# Patient Record
Sex: FEMALE | Race: WHITE | HISPANIC OR LATINO | Employment: UNEMPLOYED | URBAN - METROPOLITAN AREA
[De-identification: names, ages, dates, MRNs, and addresses within clinical notes are randomized per-mention and may not be internally consistent; named-entity substitution may affect disease eponyms.]

---

## 2024-08-20 ENCOUNTER — HOSPITAL ENCOUNTER (OUTPATIENT)
Dept: OCCUPATIONAL THERAPY | Facility: HOSPITAL | Age: 59
Setting detail: THERAPIES SERIES
Discharge: HOME | End: 2024-08-20
Payer: COMMERCIAL

## 2024-08-20 DIAGNOSIS — I89.0 LYMPHEDEMA OF BOTH LOWER EXTREMITIES: ICD-10-CM

## 2024-08-20 DIAGNOSIS — I89.0 LYMPHEDEMA, NOT ELSEWHERE CLASSIFIED: Primary | ICD-10-CM

## 2024-08-20 PROCEDURE — 97535 SELF CARE MNGMENT TRAINING: CPT

## 2024-08-20 PROCEDURE — 97165 OT EVAL LOW COMPLEX 30 MIN: CPT

## 2024-08-20 NOTE — THERAPY EVALUATION
Outpatient Occupational Therapy Lymphedema Initial Evaluation  [facility]     Patient Name: Michelle Andrews  : 1965  MRN: [id number]  Today's Date: 2024      Visit Date: 2024    There is no problem list on file for this patient.       No past medical history on file.     No past surgical history on file.      Visit Dx:     ICD-10-CM ICD-9-CM   1. Lymphedema, not elsewhere classified  I89.0 457.1   2. Lymphedema of both lower extremities  I89.0 457.1        Patient History       Row Name 24 1400             History    Chief Complaint Swelling;Pain  -CW      Date Current Problem(s) Began --  3 years ago  -CW      Brief Description of Current Complaint Pt. reports her LE edema started 3 years ago and has gotten worse. Hx cervical cancer, vein surgery, LLE tendon reconstruction, fx after a fall  LLE, , HTN, gallbladder surgery, knee arthritis. Pt. states her ortho MD may consider TKR after her swelling is down and she has lost weight.  -CW      Patient/Caregiver Goals Relieve pain;Know what to do to help the symptoms;Decrease swelling  -CW      How has patient tried to help current problem? Compression bandages, stockings, and elevation.  -CW         Services    Are you currently receiving Home Health services No  -CW         Daily Activities    Primary Language Peruvian  -CW      Action taken if English not primary language Cousin present to assist.  -CW      Are you able to read Yes  -CW      Are you able to write Yes  -CW      Teaching needs identified Management of Condition;Home Exercise Program  -CW      Patient is concerned about/has problems with Climbing Stairs;Performing home management (household chores, shopping, care of dependents);Standing;Walking  -CW      Barriers to learning Language  -CW      Functional Status mobility issues preventing performance of daily activities;dressing  -CW      Explanation of Functional Status Problem Has difficulty with LE dressing, steps,  "walking. Standing for long time periods causes LE pain. Shoes do not fit at times.  -CW      Pt Participated in POC and Goals Yes  -CW                User Key  (r) = Recorded By, (t) = Taken By, (c) = Cosigned By      Initials Name Provider Type    Lupe Tristan OTR Occupational Therapist                     Lymphedema       Row Name 08/20/24 1400             Subjective Pain    Able to rate subjective pain? yes  -CW      Pre-Treatment Pain Level 9  -CW      Post-Treatment Pain Level 9  -CW         Subjective    Subjective Comments Pt. reports BLE pain especially knees 9/10, back and L shoulder pain.  -CW         Lymphedema Assessment    Lymphedema Classification LLE:;RLE:;stage 2 (Spontaneously Irreversible)  -CW      Lymphedema Cancer Related Sx --  Hx cervical cancer 1992  -CW      Chemo Received no  -CW      Radiation Therapy Received no  -CW      Infections or Cellulitis? unspecified  -CW         LLIS - Physical Concerns    The amount of pain associated with my lymphedema is: 4  -CW      The amount of limb heaviness associated with my lymphedema is: 4  -CW      The amount of skin tightness associated with my lymphedema is: 4  -CW      The size of my swollen limb(s) seems: 4  -CW      Lymphedema affects the movement of my swollen limb(s): 4  -CW      The strength in my swollen limb(s) is: 4  -CW         LLIS - Psychosocial Concerns    Lymphedema affects my body image (i.e., \"how I think I look\"). 3  -CW      Lymphedema affects my socializing with others. 3  -CW      Lymphedema affects my intimate relations with spouse or partner (rate 0 if not applicable 3  -CW      Lymphedema \"gets me down\" (i.e., depression, frustration, or anger) 4  -CW      I must rely on others for help due to my lymphedema. 3  -CW      I know what to do to manage my lymphedema 3  -CW         LLIS - Functional Concerns    Lymphedema affects my ability to perform self-care activities (i.e. eating, dressing, hygiene) 3  -CW      Lymphedema " affects my ability to perform routine home or work-related activities. 3  -CW      Lymphedema affects my performance of preferred leisure activities. 4  -CW      Lymphedema affects proper fit of clothing/shoes 3  -CW      Lymphedema affects my sleep 3  -CW         Posture/Observations    Observations Edema  -CW      Posture/Observations Comments No AD to ambulate  -CW         General ROM    GENERAL ROM COMMENTS AROM limited B knees due to pain. Hip flex L decreased.  -CW         MMT (Manual Muscle Testing)    General MMT Comments N/A due to pain.  -CW         Lymphedema Edema Assessment    Ptting Edema Category By grade out of 4  -CW      Pitting Edema + 3/4;Moderate  -CW      Stemmer Sign positive  -CW         Skin Changes/Observations    Location/Assessment Lower Extremity  -CW      Lower Extremity Conditions bilateral:;intact;dry  -CW      Lower Extremity Color/Pigment bilateral:;fibrosis  -CW         Lymphedema Sensation    Lymphedema Sensation Reports RLE:;LLE:;normal  -CW         Lymphedema Measurements    Measurement Type(s) Circumferential  -CW      Circumferential Areas Lower extremities  -CW         BLE Circumferential (cm)    Measurement Location 1 10 cm. above knee  -CW      Left 1 61 cm  -CW      Right 1 63 cm  -CW      Measurement Location 2 knee  -CW      Left 2 56 cm  -CW      Right 2 57 cm  -CW      Measurement Location 3 10 cm below  knee  -CW      Left 3 56.5 cm  -CW      Right 3 56 cm  -CW      Measurement Location 4 20 cm below knee  -CW      Left 4 47.9 cm  -CW      Right 4 48.8 cm  -CW      Measurement Location 5 30 cm below knee  -CW      Left 5 37.5 cm  -CW      Right 5 35.9 cm  -CW      Measurement Location 6 ankle  -CW      Left 6 36.9 cm  -CW      Right 6 34.7 cm  -CW      Measurement Location 7 mid foot  -CW      Left 7 25 cm  -CW      Right 7 24.6 cm  -CW      LLE Circumferential Total 320.8 cm  -CW      RLE Circumferential Total 320 cm  -CW         Compression/Skin Care     Compression/Skin Care compression garment  -CW      Compression Garment Comments Removed compression garments from home  -CW         Lymphedema Life Impact Scale Totals    A.  Total Q1 - Q17 (Do not include Q18) 59  -CW      B.  Total number of questions answered (Q1-Q17) 17  -CW      C. Divide A by B 3.47  -CW      D. Multiple C by 25 86.75  -CW                User Key  (r) = Recorded By, (t) = Taken By, (c) = Cosigned By      Initials Name Provider Type    CW Lupe Hancock OTR Occupational Therapist                            Therapy Education  Education Details: Discussed lymph. tx. program and poc. Reviewed long term edema management options. Issued handouts including Healthy habits for edema risk reduction.  Given: Edema management, Symptoms/condition management  Program: New  How Provided: Verbal, Demonstration  Provided to: Patient  Level of Understanding: Verbalized  75933 - OT Self Care/Mgmt Minutes: 15         OT Goals       Row Name 08/20/24 1400          OT Short Term Goals    STG Date to Achieve 09/03/24  -CW     STG 1 Patient to demonstrate proper awareness of “What is Lymphedema” and DO’s and Don’ts” for improved prevention, management, care of symptoms, and ease of transition to self-care of condition.  -CW     STG 1 Progress New  -CW     STG 2 Patient independent and compliant with self-wrapping techniques of compression bandages with family member or caregiver as indicated for improved self-management of condition.  -CW     STG 2 Progress New  -CW     STG 3 Patient demonstrate decreased net edema of  BLE's   >/5-10 cm. for decreased in edema, symptoms, decreased risk of infection, and improved skin-care/transition to self-care of condition.  -CW     STG 3 Progress New  -CW     STG 4 Patient independent and compliant with home exercise program (as able) focused on range of motion, flexibility, to improve lymphatic flow and discomfort.  -CW     STG 4 Progress New  -        Long Term Goals    LTG  Date to Achieve 09/17/24  -CW     LTG 1 Patient will demonstrate decreased net edema of  BLE's >/=10-20 cm. for decrease in symptoms, decreased risk of infection, and improved skin-care/transition to self-care of condition.  -CW     LTG 1 Progress New  -CW     LTG 2 Patient/family/caregivers independent with self-care techniques for self-management of condition.  -CW     LTG 2 Progress New  -CW     LTG 3 Patient independent and compliant with use and care of compression (example garments, inelastic velcro compression) with assistance of a caregiver as needed to promote self-care independence.  -CW     LTG 3 Progress New  -CW        Time Calculation    OT Goal Re-Cert Due Date 11/12/24  -CW               User Key  (r) = Recorded By, (t) = Taken By, (c) = Cosigned By      Initials Name Provider Type    Lupe Tristan OTR Occupational Therapist                     OT Assessment/Plan       Row Name 08/20/24 1424          OT Assessment    Functional Limitations Performance in self-care ADL;Limitations in functional capacity and performance;Limitation in home management;Limitations in community activities  -CW     Impairments Edema;Impaired lymphatic circulation;Integumentary integrity;Impaired venous circulation;Pain;Range of motion  -CW     Assessment Comments Pt. presents 60 y/o female with moderate increased edema BLE’s. Edema is 3+ feet to above knees. Pt. reports her LE edema started 3 years ago and has gotten worse. Hx cervical cancer, arthritis, tendon reconstructive surgery per pt. Pt. complains of pain 9/10 B knees/lower legs. Total circumference .0 cm. and .8 cm. Skin is intact with no rashes or skin irritation. No open wounds.  Mobility including AROM is limited B knees and L hip flex. Pt. would benefit from full Complete Decongestive Therapy (CDT) to decrease edema, decrease risk of infection, improve skin integrity, and to learn independent self-care edema management. Pt. has tried LE  elevation and compression stockings in the past. Discussd long term edema management options. Pt. reports walking, steps, LE dressing and standing are difficult.  Pt. lives in [place] and is visiting family. Will try to work with pt. regarding tx and her travel plans.  -CW     Please refer to paper survey for additional self-reported information Yes  -CW     OT Rehab Potential Good  -CW     Patient/caregiver participated in establishment of treatment plan and goals Yes  -CW     Patient would benefit from skilled therapy intervention Yes  -CW        OT Plan    OT Frequency 4x/week  -CW     Predicted Duration of Therapy Intervention (OT) 1 month. Pt. lives in [place] and is visiting family in the [place]. Will work with pt. to coordinate tx with her travel plans.  -CW     Planned CPT's? OT EVAL LOW COMPLEXITY: 94590;OT MANUAL THERAPY EA 15 MIN: 01111;OT SELF CARE/MGMT/TRAIN 15 MIN: 18482;OT THER PROC EA 15 MIN: 11042WI  -CW     Planned Therapy Interventions (Optional Details) patient/family education;manual therapy techniques;home exercise program  -CW     OT Plan Comments Plan to schedule pt. for tx.  -CW               User Key  (r) = Recorded By, (t) = Taken By, (c) = Cosigned By      Initials Name Provider Type    CW Lupe Hancock OTR Occupational Therapist                              Time Calculation:   OT Start Time: 1230  OT Stop Time: 1330  OT Time Calculation (min): 60 min  Total Timed Code Minutes- OT: 15 minute(s)  Timed Charges  28196 - OT Self Care/Mgmt Minutes: 15  Total Minutes  Timed Charges Total Minutes: 15   Total Minutes: 15     Therapy Charges for Today       Code Description Service Date Service Provider Modifiers Qty    20599743634  OT SELF CARE/MGMT/TRAIN EA 15 MIN 8/20/2024 Lupe Hancock OTR GO 1    64101472399 HC OT EVAL LOW COMPLEXITY 3 8/20/2024 Lupe Hancock OTR GO 1                      RONALD Wakefield  8/20/2024

## 2024-08-21 ENCOUNTER — DOCUMENTATION (OUTPATIENT)
Dept: OCCUPATIONAL THERAPY | Facility: HOSPITAL | Age: 59
End: 2024-08-21
Payer: COMMERCIAL

## 2024-08-21 DIAGNOSIS — I89.0 LYMPHEDEMA, NOT ELSEWHERE CLASSIFIED: Primary | ICD-10-CM

## 2024-08-21 DIAGNOSIS — I89.0 LYMPHEDEMA OF BOTH LOWER EXTREMITIES: ICD-10-CM

## 2024-08-21 NOTE — THERAPY TREATMENT NOTE
Outpatient Occupational Therapy Lymphedema Treatment Note       Patient Name: Michelle Andrews  : 1965  MRN: [id number]  Today's Date: 2024      Visit Date: 2024    There is no problem list on file for this patient.       No past medical history on file.     No past surgical history on file.      Visit Dx:      ICD-10-CM ICD-9-CM   1. Lymphedema, not elsewhere classified  I89.0 457.1   2. Lymphedema of both lower extremities  I89.0 457.1                    OT Assessment/Plan       Row Name 24 1143          OT Assessment    Assessment Comments For phase 1 lymphedema treatment decongestive phase pt. needs the following bandage supplies: Tg9, Cellona 10 cm quantity 4, Rosidal soft 10 cm x.4 cm x2.5m quantity 6, Rosidal 8cm quantity 4, Rosidal 10 cm quantity 6.  -ZOE               User Key  (r) = Recorded By, (t) = Taken By, (c) = Cosigned By      Initials Name Provider Type    Lupe Tristan OTR Occupational Therapist                                               Time Calculation:                         RONALD Wakefield  2024

## 2024-08-23 ENCOUNTER — DOCUMENTATION (OUTPATIENT)
Dept: OCCUPATIONAL THERAPY | Facility: HOSPITAL | Age: 59
End: 2024-08-23
Payer: COMMERCIAL

## 2024-08-23 DIAGNOSIS — I89.0 LYMPHEDEMA, NOT ELSEWHERE CLASSIFIED: Primary | ICD-10-CM

## 2024-08-23 DIAGNOSIS — I89.0 LYMPHEDEMA OF BOTH LOWER EXTREMITIES: ICD-10-CM

## 2024-08-23 NOTE — THERAPY TREATMENT NOTE
Outpatient Occupational Therapy Lymphedema Treatment Note       Patient Name: Michelle Andrews  : 1965  MRN: [id number]  Today's Date: 2024      Visit Date: 2024    There is no problem list on file for this patient.       No past medical history on file.     No past surgical history on file.      Visit Dx:      ICD-10-CM ICD-9-CM   1. Lymphedema, not elsewhere classified  I89.0 457.1   2. Lymphedema of both lower extremities  I89.0 457.1                    OT Assessment/Plan       Row Name 24 1423          OT Assessment    Assessment Comments Pt. presents 58 y/o female with moderate increased edema 3+ BLE lymphedema feet to above knees. Skin is firm,fibrotic BLE's with discomfort and pain 9/10. Pt. has tried conservative tx and received lymphedema tx. in the past for >25 days including compression bandages, manual lymph drainage, elevation, compression stockings, HEP/therap. ex. Even though pt. has tried these conservative methods, pt.'s symptoms will most likely still persist over time and may get worse. This may lead to advances skin changes and other edema related symptoms including increased fibrosis, edema, sore/ulcers, infections. Pt. may benefit from a compression pump to help address her BLE lymphedema and to help pt. become independent with lymph. management.  -ZOE               User Key  (r) = Recorded By, (t) = Taken By, (c) = Cosigned By      Initials Name Provider Type    Lupe Tristan OTR Occupational Therapist                                               Time Calculation:                         RONALD Wakefield  2024

## 2024-08-23 NOTE — THERAPY TREATMENT NOTE
Outpatient Occupational Therapy Lymphedema Treatment Note       Patient Name: Michelle Andrews  : 1965  MRN: [id number]  Today's Date: 2024      Visit Date: 2024    There is no problem list on file for this patient.       No past medical history on file.     No past surgical history on file.      Visit Dx:      ICD-10-CM ICD-9-CM   1. Lymphedema, not elsewhere classified  I89.0 457.1                    OT Assessment/Plan       Row Name 24 1406          OT Assessment    Assessment Comments For phase 1 lymphedema tx leading to phase 2 maintenance phase of lymphedema treatment pt. needs the following:  Circaid Reduction kit Regular, standard length lower leg  quantity 2  -CW               User Key  (r) = Recorded By, (t) = Taken By, (c) = Cosigned By      Initials Name Provider Type    Lupe Tristan OTR Occupational Therapist                                               Time Calculation:                         RONALD Wakefield  2024

## 2024-08-26 ENCOUNTER — HOSPITAL ENCOUNTER (OUTPATIENT)
Dept: OCCUPATIONAL THERAPY | Facility: HOSPITAL | Age: 59
Discharge: HOME | End: 2024-08-26
Admitting: ORTHOPAEDIC SURGERY
Payer: COMMERCIAL

## 2024-08-26 DIAGNOSIS — I89.0 LYMPHEDEMA, NOT ELSEWHERE CLASSIFIED: Primary | ICD-10-CM

## 2024-08-26 DIAGNOSIS — I89.0 LYMPHEDEMA OF BOTH LOWER EXTREMITIES: ICD-10-CM

## 2024-08-26 PROCEDURE — 97140 MANUAL THERAPY 1/> REGIONS: CPT

## 2024-08-26 NOTE — THERAPY TREATMENT NOTE
Outpatient Occupational Therapy Lymphedema Treatment Note  [facility]     Patient Name: Michelle Andrews  : 1965  MRN: [id number]  Today's Date: 2024      Visit Date: 2024    There is no problem list on file for this patient.       No past medical history on file.     No past surgical history on file.      Visit Dx:      ICD-10-CM ICD-9-CM   1. Lymphedema, not elsewhere classified  I89.0 457.1   2. Lymphedema of both lower extremities  I89.0 457.1        Lymphedema       Row Name 24 0700             Subjective Pain    Able to rate subjective pain? yes  -CW      Pre-Treatment Pain Level 9  -CW      Post-Treatment Pain Level 9  -CW         Subjective    Subjective Comments Pain LLE>RLE. Reports L arm pain.  -CW         Skin Changes/Observations    Location/Assessment Lower Extremity  -CW      Lower Extremity Conditions bilateral:;intact;dry  -CW      Lower Extremity Color/Pigment bilateral:;fibrosis  -CW         Manual Lymphatic Drainage    Manual Lymphatic Drainage initial sequence;opened regional lymph nodes;opened anastamoses;extremity treatment  -CW      Initial Sequence short neck;abdomen  -CW      Abdomen superficial  -CW      Opened Regional Lymph Nodes inguinal;axillary  -CW      Axillary left;right  -CW      Inguinal left;right  -CW      Opened Anastamoses inguino-axillary  -CW      Inguino-Axillary right;left  -CW      Extremity Treatment MLD to full limb  -CW         Compression/Skin Care    Compression/Skin Care skin care;wrapping location;bandaging;compression garment;remove bandages  -CW      Skin Care moisturizing lotion applied  -CW      Wrapping Location lower extremity  -CW      Wrapping Location LE ankle;calf  -CW      Wrapping Comments Tg9, 1-10 cm, 1-15 cm Artiflex. 1-8cm, 1-10cm Rosidal  -CW      Bandage Layers cotton liner;padding/fluff layer;short-stretch bandages (comment size/quantity)  -CW      Bandaging Technique circumferential/spiral;moderate compression  -CW                 User Key  (r) = Recorded By, (t) = Taken By, (c) = Cosigned By      Initials Name Provider Type    Lupe Tristan OTANGEL Occupational Therapist                             OT Assessment/Plan       Row Name 08/26/24 1412          OT Assessment    Assessment Comments Pt. reports increased pain BLE's LLE>RLE 9/10 and LUE pain. Pt. was able to wear her compression stockings.  Skin dry and intact with no rashes or skin irritation. MLD sequence completed with pt. supine. Applied the bandages with moderate compression as tolerated.  Pt. can remove or loosen top bandage layer at night if needed for comfort. Reviewed bandage precautions and wearing schedule. Family member present to help with language interpretation.  -CW        OT Plan    OT Plan Comments Plant to cont. CDT and progress to indep. lymph. management.  -CW               User Key  (r) = Recorded By, (t) = Taken By, (c) = Cosigned By      Initials Name Provider Type    Lupe Tristan OTR Occupational Therapist                        Manual Rx (Last 36 Hours)       Manual Treatments       Row Name 08/26/24 1415             Total Minutes    44036 - OT Manual Therapy Minutes 60  -CW                User Key  (r) = Recorded By, (t) = Taken By, (c) = Cosigned By      Initials Name Provider Type    Lupe Tristan OTANGEL Occupational Therapist                      Therapy Education  Education Details: Reviewed bandage precautions and wearing schedule. Pt. can loosen or remove top bandage layer at night if needed for comfort.  Given: Edema management, Symptoms/condition management, Bandaging/dressing change  Program: New  How Provided: Verbal, Demonstration  Provided to: Patient, Caregiver  Level of Understanding: Verbalized                Time Calculation:   OT Start Time: 0730  OT Stop Time: 0830  OT Time Calculation (min): 60 min  Total Timed Code Minutes- OT: 60 minute(s)  Timed Charges  39355 - OT Manual Therapy Minutes: 60  Total Minutes  Timed Charges  Total Minutes: 60   Total Minutes: 60     Therapy Charges for Today       Code Description Service Date Service Provider Modifiers Qty    47577947138 HC OT MANUAL THERAPY EA 15 MIN 8/26/2024 Lupe Hancock OTR GO 4                        RONALD Wakefield  8/26/2024

## 2024-08-28 ENCOUNTER — HOSPITAL ENCOUNTER (OUTPATIENT)
Dept: OCCUPATIONAL THERAPY | Facility: HOSPITAL | Age: 59
Discharge: HOME | End: 2024-08-28
Admitting: ORTHOPAEDIC SURGERY
Payer: COMMERCIAL

## 2024-08-28 DIAGNOSIS — I89.0 LYMPHEDEMA OF BOTH LOWER EXTREMITIES: ICD-10-CM

## 2024-08-28 DIAGNOSIS — I89.0 LYMPHEDEMA, NOT ELSEWHERE CLASSIFIED: Primary | ICD-10-CM

## 2024-08-28 PROCEDURE — 97140 MANUAL THERAPY 1/> REGIONS: CPT

## 2024-08-28 NOTE — THERAPY TREATMENT NOTE
Outpatient Occupational Therapy Lymphedema Treatment Note  [facility]     Patient Name: Michelle Andrews  : 1965  MRN: [id number]  Today's Date: 2024      Visit Date: 2024    There is no problem list on file for this patient.       No past medical history on file.     No past surgical history on file.      Visit Dx:      ICD-10-CM ICD-9-CM   1. Lymphedema, not elsewhere classified  I89.0 457.1   2. Lymphedema of both lower extremities  I89.0 457.1        Lymphedema       Row Name 24 0900             Subjective Pain    Able to rate subjective pain? yes  -CW      Pre-Treatment Pain Level 2  -CW      Post-Treatment Pain Level 2  -CW         Subjective    Subjective Comments LE pain decreased today. Cont. L UE pain.  -CW         Skin Changes/Observations    Location/Assessment Lower Extremity  -CW      Lower Extremity Conditions bilateral:;intact;dry  -CW      Lower Extremity Color/Pigment bilateral:;fibrosis  -CW         Manual Lymphatic Drainage    Manual Lymphatic Drainage initial sequence;opened regional lymph nodes;opened anastamoses;extremity treatment  -CW      Initial Sequence short neck;abdomen  -CW      Abdomen superficial  -CW      Opened Regional Lymph Nodes inguinal;axillary  -CW      Axillary left;right  -CW      Inguinal left;right  -CW      Opened Anastamoses inguino-axillary  -CW      Inguino-Axillary right;left  -CW      Extremity Treatment MLD to full limb  -CW         Compression/Skin Care    Compression/Skin Care skin care;wrapping location;bandaging;compression garment;remove bandages  -CW      Skin Care moisturizing lotion applied  -CW      Wrapping Location lower extremity  -CW      Wrapping Location LE ankle;calf  -CW      Wrapping Comments Tg9, 1-10 cm, 1-15 cm Artiflex. 1-8cm, 1-10cm Rosidal  -CW      Bandage Layers cotton liner;padding/fluff layer;short-stretch bandages (comment size/quantity)  -CW      Bandaging Technique circumferential/spiral;moderate compression   -CW                User Key  (r) = Recorded By, (t) = Taken By, (c) = Cosigned By      Initials Name Provider Type    Lupe Tristan OTR Occupational Therapist                             OT Assessment/Plan       Row Name 08/28/24 1406          OT Assessment    Assessment Comments Pt. reports B LE pain 2/10 at present. Pt. was able to wear the bandages or her stockings for the last few days. Skin dry and intact with no rashes or skin irritation. MLD sequence completed with pt. supine. Applied the bandages with moderate compression as tolerated. Edema decreased B mid calf to ankle per observation. Pt. can remove or loosen top bandage layer at night if needed for comfort. Reviewed bandage precautions and wearing schedule. Reviewed how to apply the compression bandages. Family member present to help with language interpretation.  -CW        OT Plan    OT Plan Comments Plan to cont. CDT and progress to indep. lymph. management.  -CW               User Key  (r) = Recorded By, (t) = Taken By, (c) = Cosigned By      Initials Name Provider Type    Lupe Tristan OTR Occupational Therapist                        Manual Rx (Last 36 Hours)       Manual Treatments       Row Name 08/28/24 1409             Total Minutes    61862 - OT Manual Therapy Minutes 60  -CW                User Key  (r) = Recorded By, (t) = Taken By, (c) = Cosigned By      Initials Name Provider Type    Lupe Tristan OTR Occupational Therapist                      Therapy Education  Education Details: Reviewed bandage precautions and wearing schedule. Pt. can loosen or remove top bandage layer at night if needed for comfort. Encouraged continued BLE elevation.  Given: Edema management, Symptoms/condition management, Bandaging/dressing change, Other (comment)  Program: Reinforced  How Provided: Verbal, Demonstration  Provided to: Patient  Level of Understanding: Verbalized                Time Calculation:   OT Start Time: 0930  OT Stop Time:  1030  OT Time Calculation (min): 60 min  Total Timed Code Minutes- OT: 60 minute(s)  Timed Charges  29470 - OT Manual Therapy Minutes: 60  Total Minutes  Timed Charges Total Minutes: 60   Total Minutes: 60     Therapy Charges for Today       Code Description Service Date Service Provider Modifiers Qty    76117820339 HC OT MANUAL THERAPY EA 15 MIN 8/28/2024 Lupe Hancock OTR GO 4                        RONALD Wakefield  8/28/2024

## 2024-08-30 ENCOUNTER — HOSPITAL ENCOUNTER (OUTPATIENT)
Dept: OCCUPATIONAL THERAPY | Facility: HOSPITAL | Age: 59
Discharge: HOME | End: 2024-08-30
Payer: COMMERCIAL

## 2024-08-30 DIAGNOSIS — I89.0 LYMPHEDEMA, NOT ELSEWHERE CLASSIFIED: Primary | ICD-10-CM

## 2024-08-30 DIAGNOSIS — I89.0 LYMPHEDEMA OF BOTH LOWER EXTREMITIES: ICD-10-CM

## 2024-08-30 PROCEDURE — 97140 MANUAL THERAPY 1/> REGIONS: CPT

## 2024-08-30 NOTE — THERAPY TREATMENT NOTE
Outpatient Occupational Therapy Lymphedema Treatment Note  [facility]     Patient Name: Michelle Andrews  : 1965  MRN: [id number]  Today's Date: 2024      Visit Date: 2024    There is no problem list on file for this patient.       No past medical history on file.     No past surgical history on file.      Visit Dx:      ICD-10-CM ICD-9-CM   1. Lymphedema, not elsewhere classified  I89.0 457.1   2. Lymphedema of both lower extremities  I89.0 457.1        Lymphedema       Row Name 24 1100             Subjective Pain    Able to rate subjective pain? yes  -CW      Pre-Treatment Pain Level 6  -CW      Post-Treatment Pain Level 6  -CW         Subjective    Subjective Comments B knee pain 6/10  -CW         Skin Changes/Observations    Location/Assessment Lower Extremity  -CW      Lower Extremity Conditions bilateral:;intact;dry  -CW      Lower Extremity Color/Pigment bilateral:;fibrosis  -CW         Manual Lymphatic Drainage    Manual Lymphatic Drainage initial sequence;opened regional lymph nodes;opened anastamoses;extremity treatment  -CW      Initial Sequence short neck;abdomen  -CW      Abdomen superficial  -CW      Opened Regional Lymph Nodes inguinal;axillary  -CW      Axillary left;right  -CW      Inguinal left;right  -CW      Opened Anastamoses inguino-axillary  -CW      Inguino-Axillary right;left  -CW      Extremity Treatment MLD to full limb  -CW         Compression/Skin Care    Compression/Skin Care skin care;wrapping location;bandaging;compression garment;remove bandages  -CW      Skin Care moisturizing lotion applied  -CW      Wrapping Location lower extremity  -CW      Wrapping Location LE ankle;calf  -CW      Wrapping Comments Tg9, 1-10 cm, 1-15 cm Artiflex. 1-8cm, 1-10cm Rosidal  -CW      Bandage Layers cotton liner;padding/fluff layer;short-stretch bandages (comment size/quantity)  -CW      Bandaging Technique circumferential/spiral;moderate compression  -CW                User Key   (r) = Recorded By, (t) = Taken By, (c) = Cosigned By      Initials Name Provider Type    Lupe Tristan OTR Occupational Therapist                             OT Assessment/Plan       Row Name 08/30/24 1425          OT Assessment    Assessment Comments Pt. reports B knee pain 6/10 at present and cont. LUE pain. Pt. was able to wear the bandages day and night without discomfort. Skin dry and intact with no rashes or skin irritation. MLD sequence completed with pt. supine. Applied the bandages with moderate compression as tolerated. Edema decreased B mid calf to ankle per observation. Pt. can remove or loosen top bandage layer at night if needed for comfort. Reviewed bandage precautions and wearing schedule with pt. and cousin. Instructed how to apply the compression bandages with family present. Family member present to help with language interpretation.  -CW        OT Plan    OT Plan Comments Plan to cont. CDT and progress to indep. lymph. management.  -CW               User Key  (r) = Recorded By, (t) = Taken By, (c) = Cosigned By      Initials Name Provider Type    Lupe Tristan OTR Occupational Therapist                        Manual Rx (Last 36 Hours)       Manual Treatments       Row Name 08/30/24 1427             Total Minutes    67592 - OT Manual Therapy Minutes 61  -CW                User Key  (r) = Recorded By, (t) = Taken By, (c) = Cosigned By      Initials Name Provider Type    Lupe Tristan OTR Occupational Therapist                      Therapy Education  Education Details: Reviewed bandage precautions and wearing schedule. Pt. can loosen or remove top bandage layer at night if needed for comfort. Encouraged continued HEP and BLE elevation.  Given: Edema management, Symptoms/condition management, Bandaging/dressing change  Program: Reinforced  How Provided: Verbal, Demonstration  Provided to: Patient, Caregiver  Level of Understanding: Verbalized                Time Calculation:   OT Start  Time: 1136  OT Stop Time: 1237  OT Time Calculation (min): 61 min  Total Timed Code Minutes- OT: 61 minute(s)  Timed Charges  23964 - OT Manual Therapy Minutes: 61  Total Minutes  Timed Charges Total Minutes: 61   Total Minutes: 61     Therapy Charges for Today       Code Description Service Date Service Provider Modifiers Qty    46166028795 HC OT MANUAL THERAPY EA 15 MIN 8/30/2024 Lupe Hancock OTR GO 4                        RONALD Wakefield  8/30/2024

## 2024-09-03 ENCOUNTER — HOSPITAL ENCOUNTER (OUTPATIENT)
Dept: OCCUPATIONAL THERAPY | Facility: HOSPITAL | Age: 59
Discharge: HOME | End: 2024-09-03
Admitting: ORTHOPAEDIC SURGERY
Payer: COMMERCIAL

## 2024-09-03 DIAGNOSIS — I89.0 LYMPHEDEMA OF BOTH LOWER EXTREMITIES: ICD-10-CM

## 2024-09-03 DIAGNOSIS — I89.0 LYMPHEDEMA, NOT ELSEWHERE CLASSIFIED: Primary | ICD-10-CM

## 2024-09-03 PROCEDURE — 97140 MANUAL THERAPY 1/> REGIONS: CPT

## 2024-09-03 NOTE — THERAPY TREATMENT NOTE
Outpatient Occupational Therapy Lymphedema Treatment Note  [facility]     Patient Name: Michelle Andrews  : 1965  MRN: [id number]  Today's Date: 9/3/2024      Visit Date: 2024    There is no problem list on file for this patient.       No past medical history on file.     No past surgical history on file.      Visit Dx:      ICD-10-CM ICD-9-CM   1. Lymphedema, not elsewhere classified  I89.0 457.1   2. Lymphedema of both lower extremities  I89.0 457.1        Lymphedema       Row Name 24 1100             Subjective Pain    Able to rate subjective pain? yes  -CW      Pre-Treatment Pain Level 3  -CW      Post-Treatment Pain Level 3  -CW         Subjective    Subjective Comments LE pain and LUE pain is improved today.  -CW         Skin Changes/Observations    Location/Assessment Lower Extremity  -CW      Lower Extremity Conditions bilateral:;intact;dry  -CW      Lower Extremity Color/Pigment bilateral:;fibrosis  -CW         Manual Lymphatic Drainage    Manual Lymphatic Drainage initial sequence;opened regional lymph nodes;opened anastamoses;extremity treatment  -CW      Initial Sequence short neck;abdomen  -CW      Abdomen superficial  -CW      Opened Regional Lymph Nodes inguinal;axillary  -CW      Axillary left;right  -CW      Inguinal left;right  -CW      Opened Anastamoses inguino-axillary  -CW      Inguino-Axillary right;left  -CW      Extremity Treatment MLD to full limb  -CW         Compression/Skin Care    Compression/Skin Care skin care;wrapping location;bandaging;compression garment;remove bandages  -CW      Skin Care moisturizing lotion applied  -CW      Wrapping Location lower extremity  -CW      Wrapping Location LE ankle;calf  -CW      Wrapping Comments Tg9, 1-10 cm, 1-15 cm Artiflex. 1-8cm, 1-10cm Rosidal  -CW      Bandage Layers cotton liner;padding/fluff layer;short-stretch bandages (comment size/quantity)  -CW      Bandaging Technique circumferential/spiral;moderate compression  -CW                 User Key  (r) = Recorded By, (t) = Taken By, (c) = Cosigned By      Initials Name Provider Type    Lupe Tristan OTR Occupational Therapist                             OT Assessment/Plan       Row Name 09/03/24 1334          OT Assessment    Assessment Comments Pt. reports decreased LE and LUE pain today 3/10. Pt. was able to wear the bandages day and night without discomfort. Skin dry and intact with no rashes or skin irritation. MLD sequence completed with pt. supine. Applied the bandages with moderate compression as tolerated. Skin is softer and edema appears decreased B mid calf to ankle per observation. Pt. can remove or loosen top bandage layer at night if needed for comfort. Discussed long term edema management. Shipment pending for BLE velcro compression.  -CW        OT Plan    OT Plan Comments Plan to cont. CDT and progress to indep. lymph. management.  -CW               User Key  (r) = Recorded By, (t) = Taken By, (c) = Cosigned By      Initials Name Provider Type    Lupe Tristan OTR Occupational Therapist                        Manual Rx (Last 36 Hours)       Manual Treatments       Row Name 09/03/24 1338             Total Minutes    83588 - OT Manual Therapy Minutes 53  -CW                User Key  (r) = Recorded By, (t) = Taken By, (c) = Cosigned By      Initials Name Provider Type    Lupe Tristan OTR Occupational Therapist                      Therapy Education  Education Details: Reviewed bandage precautions and wearing schedule. Pt. can loosen or remove top bandage layer at night if needed for comfort. Encouraged continued HEP and BLE elevation.  Given: Edema management, Symptoms/condition management, Bandaging/dressing change  Program: Reinforced  How Provided: Verbal, Demonstration  Provided to: Patient  Level of Understanding: Verbalized                Time Calculation:   OT Start Time: 1135  OT Stop Time: 1228  OT Time Calculation (min): 53 min  Total Timed Code  Minutes- OT: 53 minute(s)  Timed Charges  87356 - OT Manual Therapy Minutes: 53  Total Minutes  Timed Charges Total Minutes: 53   Total Minutes: 53     Therapy Charges for Today       Code Description Service Date Service Provider Modifiers Qty    [id number]  OT MANUAL THERAPY EA 15 MIN 9/3/2024 Lupe Hancock OTR GO 4                        RONALD Wakefield  9/3/2024

## 2024-09-04 ENCOUNTER — HOSPITAL ENCOUNTER (OUTPATIENT)
Dept: OCCUPATIONAL THERAPY | Facility: HOSPITAL | Age: 59
Discharge: HOME | End: 2024-09-04
Admitting: ORTHOPAEDIC SURGERY
Payer: COMMERCIAL

## 2024-09-04 DIAGNOSIS — I89.0 LYMPHEDEMA, NOT ELSEWHERE CLASSIFIED: Primary | ICD-10-CM

## 2024-09-04 DIAGNOSIS — I89.0 LYMPHEDEMA OF BOTH LOWER EXTREMITIES: ICD-10-CM

## 2024-09-04 PROCEDURE — 97140 MANUAL THERAPY 1/> REGIONS: CPT

## 2024-09-04 NOTE — THERAPY PROGRESS REPORT/RE-CERT
Outpatient Occupational Therapy Lymphedema Progress Note  [facility]     Patient Name: Michelle Andrews  : 1965  MRN: [id number]  Today's Date: 2024      Visit Date: 2024    There is no problem list on file for this patient.       No past medical history on file.     No past surgical history on file.      Visit Dx:      ICD-10-CM ICD-9-CM   1. Lymphedema, not elsewhere classified  I89.0 457.1   2. Lymphedema of both lower extremities  I89.0 457.1        Lymphedema       Row Name 24 1100             Subjective Pain    Able to rate subjective pain? yes  -CW      Pre-Treatment Pain Level 1  -CW      Post-Treatment Pain Level 1  -CW         Subjective    Subjective Comments Overall LE pain better today.  -CW         Skin Changes/Observations    Location/Assessment Lower Extremity  -CW      Lower Extremity Conditions bilateral:;intact;dry  -CW      Lower Extremity Color/Pigment bilateral:;fibrosis  -CW         Lymphedema Measurements    Measurement Type(s) Circumferential  -CW      Circumferential Areas Lower extremities  -CW         BLE Circumferential (cm)    Measurement Location 1 10 cm. above knee  -CW      Left 1 60.7 cm  -CW      Right 1 58 cm  -CW      Measurement Location 2 knee  -CW      Left 2 54.8 cm  -CW      Right 2 54 cm  -CW      Measurement Location 3 10 cm below  knee  -CW      Left 3 56 cm  -CW      Right 3 54 cm  -CW      Measurement Location 4 20 cm below knee  -CW      Left 4 47.2 cm  -CW      Right 4 46.7 cm  -CW      Measurement Location 5 30 cm below knee  -CW      Left 5 35.9 cm  -CW      Right 5 35.5 cm  -CW      Measurement Location 6 ankle  -CW      Left 6 36 cm  -CW      Right 6 34 cm  -CW      Measurement Location 7 mid foot  -CW      Left 7 24.4 cm  -CW      Right 7 23.8 cm  -CW      LLE Circumferential Total 315 cm  -CW      RLE Circumferential Total 306 cm  -CW         Manual Lymphatic Drainage    Manual Lymphatic Drainage initial sequence;opened regional lymph  nodes;opened anastamoses;extremity treatment  -CW      Initial Sequence short neck;abdomen  -CW      Abdomen superficial  -CW      Opened Regional Lymph Nodes inguinal;axillary  -CW      Axillary left;right  -CW      Inguinal left;right  -CW      Opened Anastamoses inguino-axillary  -CW      Inguino-Axillary right;left  -CW      Extremity Treatment MLD to full limb  -CW         Compression/Skin Care    Compression/Skin Care skin care;wrapping location;bandaging;compression garment;remove bandages  -CW      Skin Care moisturizing lotion applied  -CW      Wrapping Location lower extremity  -CW      Wrapping Location LE ankle;calf  -CW      Wrapping Comments Tg9, 1-10 cm, 1-15 cm Artiflex. 1-8cm, 2-10cm Rosidal  -CW      Bandage Layers cotton liner;padding/fluff layer;short-stretch bandages (comment size/quantity)  -CW      Bandaging Technique circumferential/spiral;moderate compression  -CW                User Key  (r) = Recorded By, (t) = Taken By, (c) = Cosigned By      Initials Name Provider Type    Lupe Tristan OTR Occupational Therapist                             OT Assessment/Plan       Row Name 09/04/24 2775          OT Assessment    Assessment Comments Pt. reports her LE pain is better today with less knee pain. Pt. was able to wear her compression bandages day and night without discomfort. Skin intact and dry with no rashes or skin irritation. MLD sequence completed with pt. supine. Measurements taken. See flow sheet. Total circumference .0 cm. and  cm. (14.0cm. R and 5.8 cm L net decrease). Pt. is progressing well with decreased edema BLE's.  Applied the bandages with moderate compression as tolerated and added another bandage. Pt. can remove or loosen top bandage layer at night if needed for comfort. Reviewed progress and plan of care with pt. including appropriate modifications as needed.  -CW        OT Plan    OT Plan Comments Plan to cont. CDT and progress to independent lymphedema  management.  -CW               User Key  (r) = Recorded By, (t) = Taken By, (c) = Cosigned By      Initials Name Provider Type    CW Lupe Hancock OTR Occupational Therapist                        Manual Rx (Last 36 Hours)       Manual Treatments       Row Name 09/04/24 1355             Total Minutes    69767 - OT Manual Therapy Minutes 60  -CW                User Key  (r) = Recorded By, (t) = Taken By, (c) = Cosigned By      Initials Name Provider Type    CW Lupe Hancock OTR Occupational Therapist                   OT Goals       Row Name 09/04/24 1300          OT Short Term Goals    STG Date to Achieve 09/03/24  -CW     STG 1 Patient to demonstrate proper awareness of “What is Lymphedema” and DO’s and Don’ts” for improved prevention, management, care of symptoms, and ease of transition to self-care of condition.  -CW     STG 1 Progress Progressing  -CW     STG 2 Patient independent and compliant with self-wrapping techniques of compression bandages with family member or caregiver as indicated for improved self-management of condition.  -CW     STG 2 Progress Progressing;Ongoing  -CW     STG 3 Patient demonstrate decreased net edema of  BLE's   >/5-10 cm. for decreased in edema, symptoms, decreased risk of infection, and improved skin-care/transition to self-care of condition.  -CW     STG 3 Progress Met  -CW     STG 4 Patient independent and compliant with home exercise program (as able) focused on range of motion, flexibility, to improve lymphatic flow and discomfort.  -CW     STG 4 Progress Progressing  -CW        Long Term Goals    LTG Date to Achieve 09/17/24  -CW     LTG 1 Patient will demonstrate decreased net edema of  BLE's >/=10-20 cm. for decrease in symptoms, decreased risk of infection, and improved skin-care/transition to self-care of condition.  -CW     LTG 1 Progress Progressing;Ongoing  -CW     LTG 2 Patient/family/caregivers independent with self-care techniques for self-management of  condition.  -CW     LTG 2 Progress Ongoing  -CW     LTG 3 Patient independent and compliant with use and care of compression (example garments, inelastic velcro compression) with assistance of a caregiver as needed to promote self-care independence.  -CW     LTG 3 Progress Ongoing  -CW               User Key  (r) = Recorded By, (t) = Taken By, (c) = Cosigned By      Initials Name Provider Type    CW Lupe Hancock OTR Occupational Therapist                    Therapy Education  Education Details: Reviewed bandage precautions and wearing schedule. Pt. can loosen or remove top bandage layer at night if needed for comfort. Encouraged continued HEP and BLE elevation.  Given: Edema management, Symptoms/condition management, Bandaging/dressing change  Program: Reinforced  How Provided: Verbal, Demonstration  Provided to: Patient, Caregiver  Level of Understanding: Verbalized                Time Calculation:   OT Start Time: 1130  OT Stop Time: 1230  OT Time Calculation (min): 60 min  Total Timed Code Minutes- OT: 60 minute(s)  Timed Charges  69679 - OT Manual Therapy Minutes: 60  Total Minutes  Timed Charges Total Minutes: 60   Total Minutes: 60     Therapy Charges for Today       Code Description Service Date Service Provider Modifiers Qty    [id number]  OT MANUAL THERAPY EA 15 MIN 9/4/2024 Lupe Hancock OTR GO 4                        RONALD Wakefiled  9/4/2024

## 2024-09-06 ENCOUNTER — HOSPITAL ENCOUNTER (OUTPATIENT)
Dept: OCCUPATIONAL THERAPY | Facility: HOSPITAL | Age: 59
Setting detail: THERAPIES SERIES
Discharge: HOME | End: 2024-09-06
Payer: COMMERCIAL

## 2024-09-06 DIAGNOSIS — I89.0 LYMPHEDEMA, NOT ELSEWHERE CLASSIFIED: Primary | ICD-10-CM

## 2024-09-06 DIAGNOSIS — I89.0 LYMPHEDEMA OF BOTH LOWER EXTREMITIES: ICD-10-CM

## 2024-09-06 PROCEDURE — 97140 MANUAL THERAPY 1/> REGIONS: CPT

## 2024-09-06 NOTE — THERAPY TREATMENT NOTE
Outpatient Occupational Therapy Lymphedema Treatment Note  [facility]     Patient Name: Michelle Andrews  : 1965  MRN: [id number]  Today's Date: 2024      Visit Date: 2024    There is no problem list on file for this patient.       No past medical history on file.     No past surgical history on file.      Visit Dx:      ICD-10-CM ICD-9-CM   1. Lymphedema, not elsewhere classified  I89.0 457.1   2. Lymphedema of both lower extremities  I89.0 457.1        Lymphedema       Row Name 24 1100             Subjective Pain    Able to rate subjective pain? yes  -CW      Pre-Treatment Pain Level 1  -CW      Post-Treatment Pain Level 1  -CW         Subjective    Subjective Comments LE knee pain decreased today.  -CW         Skin Changes/Observations    Location/Assessment Lower Extremity  -CW      Lower Extremity Conditions bilateral:;intact;dry  -CW      Lower Extremity Color/Pigment bilateral:;fibrosis  -CW         Manual Lymphatic Drainage    Manual Lymphatic Drainage initial sequence;opened regional lymph nodes;opened anastamoses;extremity treatment  -CW      Initial Sequence short neck;abdomen  -CW      Abdomen superficial  -CW      Opened Regional Lymph Nodes inguinal;axillary  -CW      Axillary left;right  -CW      Inguinal left;right  -CW      Opened Anastamoses inguino-axillary  -CW      Inguino-Axillary right;left  -CW      Extremity Treatment MLD to full limb  -CW      MLD to Full Limb BLE's  -CW         Compression/Skin Care    Compression/Skin Care skin care;wrapping location;bandaging;compression garment;remove bandages  -CW      Skin Care moisturizing lotion applied  -CW      Wrapping Location lower extremity  -CW      Wrapping Location LE ankle;calf  -CW      Wrapping Comments Tg9, 1-10 cm, 1-15 cm Artiflex. 1-8cm, 2-10cm Rosidal  -CW      Bandage Layers cotton liner;padding/fluff layer;short-stretch bandages (comment size/quantity)  -CW      Bandaging Technique  circumferential/spiral;moderate compression  -CW                User Key  (r) = Recorded By, (t) = Taken By, (c) = Cosigned By      Initials Name Provider Type    Lupe Tristan OTR Occupational Therapist                             OT Assessment/Plan       Row Name 09/06/24 1402          OT Assessment    Assessment Comments Pt. reports overall decreased B knee pain 1/10. Pt. was able to wear the bandages day and night without discomfort. Skin dry and intact with no rashes or skin irritation. MLD sequence completed with pt. supine. Applied the bandages with moderate compression as tolerated. Edema decreased B mid calf to ankle per observation. Pt. can remove or loosen top bandage layer at night if needed for comfort. Reviewed bandage precautions and wearing schedule.  -CW        OT Plan    OT Plan Comments Plan to cont. CDT and progress to indep. lymph. management.  -CW               User Key  (r) = Recorded By, (t) = Taken By, (c) = Cosigned By      Initials Name Provider Type    Lupe Tristan OTR Occupational Therapist                        Manual Rx (Last 36 Hours)       Manual Treatments       Row Name 09/06/24 1404             Total Minutes    77594 - OT Manual Therapy Minutes 60  -CW                User Key  (r) = Recorded By, (t) = Taken By, (c) = Cosigned By      Initials Name Provider Type    Lupe Tristan OTR Occupational Therapist                      Therapy Education  Education Details: Reviewed bandage precautions and wearing schedule. Pt. can loosen or remove top bandage layer at night if needed for comfort. Encouraged continued HEP and BLE elevation.  Given: Edema management, Symptoms/condition management, Bandaging/dressing change  Program: Reinforced  How Provided: Verbal, Demonstration  Provided to: Patient, Caregiver  Level of Understanding: Verbalized                Time Calculation:   OT Start Time: 1135  OT Stop Time: 1235  OT Time Calculation (min): 60 min  Total Timed Code  Minutes- OT: 60 minute(s)  Timed Charges  73419 - OT Manual Therapy Minutes: 60  Total Minutes  Timed Charges Total Minutes: 60   Total Minutes: 60     Therapy Charges for Today       Code Description Service Date Service Provider Modifiers Qty    [id number] HC OT MANUAL THERAPY EA 15 MIN 9/6/2024 Lupe Hancock OTR GO 4                        RONALD Wakefield  9/6/2024

## 2024-09-09 ENCOUNTER — HOSPITAL ENCOUNTER (OUTPATIENT)
Dept: OCCUPATIONAL THERAPY | Facility: HOSPITAL | Age: 59
Discharge: HOME | End: 2024-09-09
Admitting: ORTHOPAEDIC SURGERY
Payer: COMMERCIAL

## 2024-09-09 DIAGNOSIS — I89.0 LYMPHEDEMA OF BOTH LOWER EXTREMITIES: ICD-10-CM

## 2024-09-09 DIAGNOSIS — I89.0 LYMPHEDEMA, NOT ELSEWHERE CLASSIFIED: Primary | ICD-10-CM

## 2024-09-09 PROCEDURE — 97140 MANUAL THERAPY 1/> REGIONS: CPT

## 2024-09-09 NOTE — THERAPY TREATMENT NOTE
Outpatient Occupational Therapy Lymphedema Treatment Note  [facility]     Patient Name: Michelle Andrews  : 1965  MRN: [id number]  Today's Date: 2024      Visit Date: 2024    There is no problem list on file for this patient.       No past medical history on file.     No past surgical history on file.      Visit Dx:      ICD-10-CM ICD-9-CM   1. Lymphedema, not elsewhere classified  I89.0 457.1   2. Lymphedema of both lower extremities  I89.0 457.1        Lymphedema       Row Name 24 1100             Subjective Pain    Able to rate subjective pain? yes  -CW      Pre-Treatment Pain Level 0  -CW      Post-Treatment Pain Level 0  -CW         Subjective    Subjective Comments No increased LE pain c/o at present.  -CW         Skin Changes/Observations    Location/Assessment Lower Extremity  -CW      Lower Extremity Conditions bilateral:;intact;dry  -CW      Lower Extremity Color/Pigment bilateral:;fibrosis  -CW         Manual Lymphatic Drainage    Manual Lymphatic Drainage initial sequence;opened regional lymph nodes;opened anastamoses;extremity treatment  -CW      Initial Sequence short neck;abdomen  -CW      Abdomen superficial  -CW      Opened Regional Lymph Nodes inguinal;axillary  -CW      Axillary left;right  -CW      Inguinal left;right  -CW      Opened Anastamoses inguino-axillary  -CW      Inguino-Axillary right;left  -CW      Extremity Treatment MLD to full limb  -CW      MLD to Full Limb BLE's  -CW         Compression/Skin Care    Compression/Skin Care skin care;wrapping location;bandaging;compression garment;remove bandages  -CW      Skin Care moisturizing lotion applied  -CW      Wrapping Location lower extremity  -CW      Wrapping Location LE ankle;calf  -CW      Wrapping Comments Tg9, 1-10 cm, 1-15 cm Artiflex. 1-8cm, 2-10cm Rosidal  -CW      Bandage Layers cotton liner;padding/fluff layer;short-stretch bandages (comment size/quantity)  -CW      Bandaging Technique  circumferential/spiral;moderate compression  -CW                User Key  (r) = Recorded By, (t) = Taken By, (c) = Cosigned By      Initials Name Provider Type    Lupe Tristan OTR Occupational Therapist                             OT Assessment/Plan       Row Name 09/09/24 1342          OT Assessment    Assessment Comments Pt. reports LE pain has improved to 0/10 at present. Pain increases after walking longer time periods. Pt. was able to wear the bandages day and night without discomfort. Skin dry and intact with no rashes or skin irritation. MLD sequence completed with pt. supine. Applied the bandages with moderate compression as tolerated. Pt. can remove or loosen top bandage layer at night if needed for comfort. Reviewed bandage precautions and wearing schedule. Encouraged cont. HEP and BLE elevation.  -CW        OT Plan    OT Plan Comments Plan to cont. CDT and progress to indep. lymph. management.  -CW               User Key  (r) = Recorded By, (t) = Taken By, (c) = Cosigned By      Initials Name Provider Type    Lupe Tristan OTR Occupational Therapist                        Manual Rx (Last 36 Hours)       Manual Treatments       Row Name 09/09/24 1345             Total Minutes    76603 - OT Manual Therapy Minutes 60  -CW                User Key  (r) = Recorded By, (t) = Taken By, (c) = Cosigned By      Initials Name Provider Type    Lupe Tristan OTR Occupational Therapist                      Therapy Education  Education Details: Reviewed bandage precautions and wearing schedule. Pt. can loosen or remove top bandage layer at night if needed for comfort. Encouraged continued HEP and BLE elevation.  Given: Edema management, Symptoms/condition management, Bandaging/dressing change  Program: Reinforced  How Provided: Verbal, Demonstration  Provided to: Patient  Level of Understanding: Verbalized                Time Calculation:   OT Start Time: 1130  OT Stop Time: 1230  OT Time Calculation (min):  60 min  Total Timed Code Minutes- OT: 60 minute(s)  Timed Charges  18127 - OT Manual Therapy Minutes: 60  Total Minutes  Timed Charges Total Minutes: 60   Total Minutes: 60     Therapy Charges for Today       Code Description Service Date Service Provider Modifiers Qty    49576696223 HC OT MANUAL THERAPY EA 15 MIN 9/9/2024 Lupe Hancock OTR GO 4                        RONALD Wakefield  9/9/2024

## 2024-09-10 ENCOUNTER — HOSPITAL ENCOUNTER (OUTPATIENT)
Dept: OCCUPATIONAL THERAPY | Facility: HOSPITAL | Age: 59
Discharge: HOME | End: 2024-09-10
Admitting: ORTHOPAEDIC SURGERY
Payer: COMMERCIAL

## 2024-09-10 DIAGNOSIS — I89.0 LYMPHEDEMA, NOT ELSEWHERE CLASSIFIED: Primary | ICD-10-CM

## 2024-09-10 DIAGNOSIS — I89.0 LYMPHEDEMA OF BOTH LOWER EXTREMITIES: ICD-10-CM

## 2024-09-10 PROCEDURE — 97140 MANUAL THERAPY 1/> REGIONS: CPT

## 2024-09-10 NOTE — THERAPY TREATMENT NOTE
Outpatient Occupational Therapy Lymphedema Treatment Note  [facility]     Patient Name: Michelle Andrews  : 1965  MRN: [id number]  Today's Date: 9/10/2024      Visit Date: 09/10/2024    There is no problem list on file for this patient.       No past medical history on file.     No past surgical history on file.      Visit Dx:      ICD-10-CM ICD-9-CM   1. Lymphedema, not elsewhere classified  I89.0 457.1   2. Lymphedema of both lower extremities  I89.0 457.1        Lymphedema       Row Name 09/10/24 1200             Subjective Pain    Able to rate subjective pain? yes  -CW      Pre-Treatment Pain Level 5  -CW      Post-Treatment Pain Level 5  -CW         Subjective    Subjective Comments Increase ankle pain today.  -CW         Skin Changes/Observations    Location/Assessment Lower Extremity  -CW      Lower Extremity Conditions bilateral:;intact;dry  -CW      Lower Extremity Color/Pigment bilateral:;fibrosis  -CW         Manual Lymphatic Drainage    Manual Lymphatic Drainage initial sequence;opened regional lymph nodes;opened anastamoses;extremity treatment  -CW      Initial Sequence short neck;abdomen  -CW      Abdomen superficial  -CW      Opened Regional Lymph Nodes inguinal;axillary  -CW      Axillary left;right  -CW      Inguinal left;right  -CW      Opened Anastamoses inguino-axillary  -CW      Inguino-Axillary right;left  -CW      Extremity Treatment MLD to full limb  -CW         Compression/Skin Care    Compression/Skin Care skin care;wrapping location;bandaging;compression garment;remove bandages  -CW      Skin Care moisturizing lotion applied  -CW      Wrapping Location lower extremity  -CW      Wrapping Location LE ankle;calf  -CW      Wrapping Comments Tg9, 1-10 cm, 1-15 cm Artiflex. 1-8cm, 2-10cm Rosidal. Added Rosidal soft to B LE's above ankles  -CW      Bandage Layers cotton liner;padding/fluff layer;short-stretch bandages (comment size/quantity)  -CW      Bandaging Technique  circumferential/spiral;moderate compression  -CW      Remove Bandages Bandages were removed at home.  -CW                User Key  (r) = Recorded By, (t) = Taken By, (c) = Cosigned By      Initials Name Provider Type    Lupe Tristan OTR Occupational Therapist                             OT Assessment/Plan       Row Name 09/10/24 1320          OT Assessment    Assessment Comments Pt. reports increased B ankle pain at present 5/10. Pt. was able to wear the bandages day and night  Skin dry and intact with no rashes or skin irritation. MLD sequence completed with pt. supine. Applied the bandages with moderate compression as tolerated and added Rosidal soft to B lower legs above ankles for comfort.  Edema decreased B mid calf to ankle per observation. Skin softer and not as firm B lowe legs. Pt. can remove or loosen top bandage layer at night if needed for comfort. Reviewed bandage precautions and wearing schedule. Pt. received BLE Reduction kit lower legs via mail. Plan to remeasure pt. tomorrow.  -CW        OT Plan    OT Plan Comments Plan to cont. CDT and progress to indep. lymph. management.  -CW               User Key  (r) = Recorded By, (t) = Taken By, (c) = Cosigned By      Initials Name Provider Type    Lupe Tristan OTR Occupational Therapist                        Manual Rx (Last 36 Hours)       Manual Treatments       Row Name 09/10/24 1324             Total Minutes    28102 - OT Manual Therapy Minutes 60  -CW                User Key  (r) = Recorded By, (t) = Taken By, (c) = Cosigned By      Initials Name Provider Type    Lupe Tristan OTR Occupational Therapist                      Therapy Education  Education Details: Reviewed bandage precautions and wearing schedule. Pt. can loosen or remove top bandage layer at night if needed for comfort. Encouraged continued HEP and BLE elevation.  Given: Edema management, Symptoms/condition management, Bandaging/dressing change  Program: Reinforced  How  Provided: Verbal, Demonstration  Provided to: Patient  Level of Understanding: Verbalized                Time Calculation:   OT Start Time: 1203  OT Stop Time: 1303  OT Time Calculation (min): 60 min  Total Timed Code Minutes- OT: 60 minute(s)  Timed Charges  26969 - OT Manual Therapy Minutes: 60  Total Minutes  Timed Charges Total Minutes: 60   Total Minutes: 60     Therapy Charges for Today       Code Description Service Date Service Provider Modifiers Qty    98952520547 HC OT MANUAL THERAPY EA 15 MIN 9/10/2024 Lupe Hancock OTR GO 4                        RONALD Wakefield  9/10/2024

## 2024-09-11 ENCOUNTER — HOSPITAL ENCOUNTER (OUTPATIENT)
Dept: OCCUPATIONAL THERAPY | Facility: HOSPITAL | Age: 59
Setting detail: THERAPIES SERIES
Discharge: HOME | End: 2024-09-11
Payer: COMMERCIAL

## 2024-09-11 DIAGNOSIS — I89.0 LYMPHEDEMA, NOT ELSEWHERE CLASSIFIED: Primary | ICD-10-CM

## 2024-09-11 DIAGNOSIS — I89.0 LYMPHEDEMA OF BOTH LOWER EXTREMITIES: ICD-10-CM

## 2024-09-11 PROCEDURE — 97140 MANUAL THERAPY 1/> REGIONS: CPT

## 2024-09-11 NOTE — THERAPY PROGRESS REPORT/RE-CERT
Outpatient Occupational Therapy Lymphedema Progress Note  [facility]     Patient Name: Michelle Andrews  : 1965  MRN: [id number]  Today's Date: 2024      Visit Date: 2024    There is no problem list on file for this patient.       No past medical history on file.     No past surgical history on file.      Visit Dx:      ICD-10-CM ICD-9-CM   1. Lymphedema, not elsewhere classified  I89.0 457.1   2. Lymphedema of both lower extremities  I89.0 457.1        Lymphedema       Row Name 24 1100             Subjective Pain    Able to rate subjective pain? yes  -CW      Pre-Treatment Pain Level 5  -CW      Post-Treatment Pain Level 5  -CW         Subjective    Subjective Comments Knee pain R 4/10, and L 5/10.  -CW         Skin Changes/Observations    Location/Assessment Lower Extremity  -CW      Lower Extremity Conditions bilateral:;intact;dry  -CW      Lower Extremity Color/Pigment bilateral:;fibrosis  -CW         Lymphedema Measurements    Measurement Type(s) Circumferential  -CW      Circumferential Areas Lower extremities  -CW         BLE Circumferential (cm)    Measurement Location 1 10 cm. above knee  -CW      Left 1 60.7 cm  -CW      Right 1 58 cm  -CW      Measurement Location 2 knee  -CW      Left 2 56 cm  -CW      Right 2 54 cm  -CW      Measurement Location 3 10 cm below  knee  -CW      Left 3 55 cm  -CW      Right 3 53.5 cm  -CW      Measurement Location 4 20 cm below knee  -CW      Left 4 46 cm  -CW      Right 4 45 cm  -CW      Measurement Location 5 30 cm below knee  -CW      Left 5 35 cm  -CW      Right 5 34 cm  -CW      Measurement Location 6 ankle  -CW      Left 6 35.9 cm  -CW      Right 6 34 cm  -CW      Measurement Location 7 mid foot  -CW      Left 7 24 cm  -CW      Right 7 23.3 cm  -CW      LLE Circumferential Total 312.6 cm  -CW      RLE Circumferential Total 301.8 cm  -CW         Manual Lymphatic Drainage    Manual Lymphatic Drainage initial sequence;opened regional lymph  nodes;opened anastamoses;extremity treatment  -CW      Initial Sequence short neck;abdomen  -CW      Abdomen superficial  -CW      Opened Regional Lymph Nodes inguinal;axillary  -CW      Axillary left;right  -CW      Inguinal left;right  -CW      Opened Anastamoses inguino-axillary  -CW      Inguino-Axillary right;left  -CW      Extremity Treatment MLD to full limb  -CW         Compression/Skin Care    Compression/Skin Care skin care;wrapping location;bandaging;compression garment;remove bandages  -CW      Skin Care moisturizing lotion applied  -CW      Wrapping Location lower extremity  -CW      Wrapping Location LE ankle;calf  -CW      Wrapping Comments Tg9, 1-10 cm, 1-15 cm Artiflex. 1-8cm, 2-10cm Rosidal. Added Rosidal soft to B LE's above ankles  -CW      Bandage Layers cotton liner;padding/fluff layer;short-stretch bandages (comment size/quantity)  -CW      Bandaging Technique circumferential/spiral;moderate compression  -CW      Remove Bandages Bandages were removed at home. Stockings removed in the clinic  -CW                User Key  (r) = Recorded By, (t) = Taken By, (c) = Cosigned By      Initials Name Provider Type    CW Lupe Hancock OTR Occupational Therapist                             OT Assessment/Plan       Row Name 09/11/24 9949          OT Assessment    Assessment Comments Pt. reports L knee pain 5/10 and R 4/10.  Pt. was able to wear the bandages day and night without discomfort. Skin dry and intact with no skin irritation.  Measurements taken. See flow sheet. Total circumference .8 cm and .6cm  (18.2 cm. R and 8.2 cm L net decrease). Pt. is progressing with decreased edema BLE's. Goals updated and reviewed POC with pt. Insurance pending for a compression pump. Order placed to [employer] for the compression pump. Pt received BLE Reduction kit lower legs via mail.  -CW        OT Plan    OT Plan Comments Plan to cont. CDT and progress to indep. lymph. management.  -CW                User Key  (r) = Recorded By, (t) = Taken By, (c) = Cosigned By      Initials Name Provider Type    CW Lupe Hancock OTR Occupational Therapist                        Manual Rx (Last 36 Hours)       Manual Treatments       Row Name 09/11/24 1411             Total Minutes    81469 - OT Manual Therapy Minutes 60  -CW                User Key  (r) = Recorded By, (t) = Taken By, (c) = Cosigned By      Initials Name Provider Type    CW Lupe Hancock OTR Occupational Therapist                   OT Goals       Row Name 09/11/24 1400          OT Short Term Goals    STG Date to Achieve 09/03/24  -CW     STG 1 Patient to demonstrate proper awareness of “What is Lymphedema” and DO’s and Don’ts” for improved prevention, management, care of symptoms, and ease of transition to self-care of condition.  -CW     STG 1 Progress Progressing  -CW     STG 2 Patient independent and compliant with self-wrapping techniques of compression bandages with family member or caregiver as indicated for improved self-management of condition.  -CW     STG 2 Progress Progressing;Ongoing  -CW     STG 3 Patient demonstrate decreased net edema of  BLE's   >/5-10 cm. for decreased in edema, symptoms, decreased risk of infection, and improved skin-care/transition to self-care of condition.  -CW     STG 3 Progress Met  -CW     STG 4 Patient independent and compliant with home exercise program (as able) focused on range of motion, flexibility, to improve lymphatic flow and discomfort.  -CW     STG 4 Progress Met  -CW        Long Term Goals    LTG Date to Achieve 09/17/24  -CW     LTG 1 Patient will demonstrate decreased net edema of  BLE's >/=10-20 cm. for decrease in symptoms, decreased risk of infection, and improved skin-care/transition to self-care of condition.  -CW     LTG 1 Progress Progressing;Ongoing  -CW     LTG 2 Patient/family/caregivers independent with self-care techniques for self-management of condition.  -CW     LTG 2 Progress Ongoing   -CW     LTG 3 Patient independent and compliant with use and care of compression (example garments, inelastic velcro compression) with assistance of a caregiver as needed to promote self-care independence.  -CW     LTG 3 Progress Ongoing  -CW               User Key  (r) = Recorded By, (t) = Taken By, (c) = Cosigned By      Initials Name Provider Type    Lupe Tristan OTR Occupational Therapist                    Therapy Education  Education Details: Reviewed bandage precautions and wearing schedule. Pt. can loosen or remove top bandage layer at night if needed for comfort. Encouraged continued HEP and BLE elevation.  Given: Edema management, Symptoms/condition management, Bandaging/dressing change  Program: Reinforced, Progressed  How Provided: Verbal, Demonstration  Provided to: Patient, Caregiver  Level of Understanding: Verbalized                Time Calculation:   OT Start Time: 1140  OT Stop Time: 1240  OT Time Calculation (min): 60 min  Total Timed Code Minutes- OT: 60 minute(s)  Timed Charges  64883 - OT Manual Therapy Minutes: 60  Total Minutes  Timed Charges Total Minutes: 60   Total Minutes: 60     Therapy Charges for Today       Code Description Service Date Service Provider Modifiers Qty    35721492076 HC OT MANUAL THERAPY EA 15 MIN 9/11/2024 Lupe Hancock OTR GO 4                        RONALD Wakefield  9/11/2024

## 2024-09-13 ENCOUNTER — HOSPITAL ENCOUNTER (OUTPATIENT)
Dept: OCCUPATIONAL THERAPY | Facility: HOSPITAL | Age: 59
Setting detail: THERAPIES SERIES
Discharge: HOME | End: 2024-09-13
Payer: COMMERCIAL

## 2024-09-13 DIAGNOSIS — I89.0 LYMPHEDEMA, NOT ELSEWHERE CLASSIFIED: Primary | ICD-10-CM

## 2024-09-13 DIAGNOSIS — I89.0 LYMPHEDEMA OF BOTH LOWER EXTREMITIES: ICD-10-CM

## 2024-09-13 PROCEDURE — 97140 MANUAL THERAPY 1/> REGIONS: CPT

## 2024-09-13 NOTE — THERAPY TREATMENT NOTE
Outpatient Occupational Therapy Lymphedema Treatment Note  [facility]     Patient Name: Michelle Andrews  : 1965  MRN: [id number]  Today's Date: 2024      Visit Date: 2024    There is no problem list on file for this patient.       No past medical history on file.     No past surgical history on file.      Visit Dx:      ICD-10-CM ICD-9-CM   1. Lymphedema, not elsewhere classified  I89.0 457.1   2. Lymphedema of both lower extremities  I89.0 457.1        Lymphedema       Row Name 24 1100             Subjective Pain    Able to rate subjective pain? yes  -CW      Pre-Treatment Pain Level 5  -CW      Post-Treatment Pain Level 5  -CW      Subjective Pain Comment 5.5 Pain today  -CW         Subjective    Subjective Comments BLE pain 5.5/10 today  -CW         Skin Changes/Observations    Location/Assessment Lower Extremity  -CW      Lower Extremity Conditions bilateral:;intact;dry  -CW      Lower Extremity Color/Pigment bilateral:;fibrosis  -CW         Manual Lymphatic Drainage    Manual Lymphatic Drainage initial sequence;opened regional lymph nodes;opened anastamoses;extremity treatment  -CW      Initial Sequence short neck;abdomen  -CW      Abdomen superficial  -CW      Opened Regional Lymph Nodes inguinal;axillary  -CW      Axillary left;right  -CW      Inguinal left;right  -CW      Opened Anastamoses inguino-axillary  -CW      Inguino-Axillary right;left  -CW      Extremity Treatment MLD to full limb  -CW         Compression/Skin Care    Compression/Skin Care skin care;wrapping location;bandaging;compression garment;remove bandages  -CW      Skin Care moisturizing lotion applied  -CW      Wrapping Location lower extremity  -CW      Wrapping Location LE ankle;calf  -CW      Wrapping Comments Tg9, 1-10 cm, 1-15 cm Artiflex. 1-8cm, 2-10cm Rosidal. Added Rosidal soft to B LE's above ankles  -CW      Bandage Layers cotton liner;padding/fluff layer;short-stretch bandages (comment size/quantity)  -CW       Bandaging Technique circumferential/spiral;moderate compression  -CW      Remove Bandages Bandages were removed at home. Stockings removed in the clinic  -CW                User Key  (r) = Recorded By, (t) = Taken By, (c) = Cosigned By      Initials Name Provider Type    Lupe Tristan OTR Occupational Therapist                             OT Assessment/Plan       Row Name 09/13/24 8347          OT Assessment    Assessment Comments Pt. reports LE pain 5.5/10 at present. Pt. was able to wear the bandages during the day and had to remove at night . Skin dry and intact with no rashes or skin irritation. MLD sequence completed with pt. supine. Applied the bandages with moderate compression as tolerated and added extra padding B ankles for comfort.  Edema decreased B mid calf to ankle per observation. Pt. can remove or loosen top bandage layer at night if needed for comfort. Reviewed bandage precautions and wearing schedule. Order placed to [employer] for the compression pump. Pt. received BLE Reduction kit lower legs via mail.  -CW        OT Plan    OT Plan Comments Plan to cont. CDT and progress to indep. lymph management  -CW               User Key  (r) = Recorded By, (t) = Taken By, (c) = Cosigned By      Initials Name Provider Type    Lupe Tristan OTR Occupational Therapist                        Manual Rx (Last 36 Hours)       Manual Treatments       Row Name 09/13/24 1406             Total Minutes    46618 - OT Manual Therapy Minutes 60  -CW                User Key  (r) = Recorded By, (t) = Taken By, (c) = Cosigned By      Initials Name Provider Type    Lupe Tristan OTR Occupational Therapist                      Therapy Education  Education Details: Reviewed bandage precautions and wearing schedule. Pt. can loosen or remove top bandage layer at night if needed for comfort. Encouraged continued HEP and BLE elevation.  Given: Edema management, Symptoms/condition management, Bandaging/dressing  change  Program: Reinforced  How Provided: Verbal, Demonstration  Provided to: Patient  Level of Understanding: Verbalized                Time Calculation:   OT Start Time: 1136  OT Stop Time: 1236  OT Time Calculation (min): 60 min  Total Timed Code Minutes- OT: 60 minute(s)  Timed Charges  78014 - OT Manual Therapy Minutes: 60  Total Minutes  Timed Charges Total Minutes: 60   Total Minutes: 60     Therapy Charges for Today       Code Description Service Date Service Provider Modifiers Qty    12477648576 HC OT MANUAL THERAPY EA 15 MIN 9/13/2024 Lupe Hancock OTR GO 4                        RONALD Wakefield  9/13/2024

## 2024-09-16 ENCOUNTER — HOSPITAL ENCOUNTER (OUTPATIENT)
Dept: OCCUPATIONAL THERAPY | Facility: HOSPITAL | Age: 59
Setting detail: THERAPIES SERIES
Discharge: HOME | End: 2024-09-16
Payer: COMMERCIAL

## 2024-09-16 DIAGNOSIS — I89.0 LYMPHEDEMA OF BOTH LOWER EXTREMITIES: ICD-10-CM

## 2024-09-16 DIAGNOSIS — I89.0 LYMPHEDEMA, NOT ELSEWHERE CLASSIFIED: Primary | ICD-10-CM

## 2024-09-16 PROCEDURE — 97140 MANUAL THERAPY 1/> REGIONS: CPT

## 2024-09-17 ENCOUNTER — HOSPITAL ENCOUNTER (OUTPATIENT)
Dept: OCCUPATIONAL THERAPY | Facility: HOSPITAL | Age: 59
Discharge: HOME | End: 2024-09-17
Admitting: ORTHOPAEDIC SURGERY
Payer: COMMERCIAL

## 2024-09-17 DIAGNOSIS — I89.0 LYMPHEDEMA OF BOTH LOWER EXTREMITIES: ICD-10-CM

## 2024-09-17 DIAGNOSIS — I89.0 LYMPHEDEMA, NOT ELSEWHERE CLASSIFIED: Primary | ICD-10-CM

## 2024-09-17 PROCEDURE — 97535 SELF CARE MNGMENT TRAINING: CPT

## 2024-09-17 PROCEDURE — 97140 MANUAL THERAPY 1/> REGIONS: CPT

## 2024-09-23 ENCOUNTER — HOSPITAL ENCOUNTER (OUTPATIENT)
Dept: OCCUPATIONAL THERAPY | Facility: HOSPITAL | Age: 59
Setting detail: THERAPIES SERIES
Discharge: HOME | End: 2024-09-23
Payer: COMMERCIAL

## 2024-09-23 DIAGNOSIS — I89.0 LYMPHEDEMA, NOT ELSEWHERE CLASSIFIED: Primary | ICD-10-CM

## 2024-09-23 DIAGNOSIS — I89.0 LYMPHEDEMA OF BOTH LOWER EXTREMITIES: ICD-10-CM

## 2024-09-23 PROCEDURE — 97140 MANUAL THERAPY 1/> REGIONS: CPT

## 2024-09-24 ENCOUNTER — HOSPITAL ENCOUNTER (OUTPATIENT)
Dept: OCCUPATIONAL THERAPY | Facility: HOSPITAL | Age: 59
Discharge: HOME | End: 2024-09-24
Payer: COMMERCIAL

## 2024-09-24 DIAGNOSIS — I89.0 LYMPHEDEMA, NOT ELSEWHERE CLASSIFIED: Primary | ICD-10-CM

## 2024-09-24 PROCEDURE — 97140 MANUAL THERAPY 1/> REGIONS: CPT

## 2024-09-25 ENCOUNTER — HOSPITAL ENCOUNTER (OUTPATIENT)
Dept: OCCUPATIONAL THERAPY | Facility: HOSPITAL | Age: 59
Discharge: HOME | End: 2024-09-25
Payer: COMMERCIAL

## 2024-09-25 DIAGNOSIS — I89.0 LYMPHEDEMA, NOT ELSEWHERE CLASSIFIED: Primary | ICD-10-CM

## 2024-09-25 DIAGNOSIS — I89.0 LYMPHEDEMA OF BOTH LOWER EXTREMITIES: ICD-10-CM

## 2024-09-25 PROCEDURE — 97140 MANUAL THERAPY 1/> REGIONS: CPT
